# Patient Record
Sex: MALE | Race: WHITE | Employment: FULL TIME | URBAN - METROPOLITAN AREA
[De-identification: names, ages, dates, MRNs, and addresses within clinical notes are randomized per-mention and may not be internally consistent; named-entity substitution may affect disease eponyms.]

---

## 2017-05-25 ENCOUNTER — OFFICE VISIT (OUTPATIENT)
Dept: FAMILY MEDICINE CLINIC | Facility: CLINIC | Age: 52
End: 2017-05-25

## 2017-05-25 ENCOUNTER — APPOINTMENT (OUTPATIENT)
Dept: LAB | Age: 52
End: 2017-05-25
Attending: FAMILY MEDICINE
Payer: COMMERCIAL

## 2017-05-25 VITALS
WEIGHT: 173 LBS | BODY MASS INDEX: 23.69 KG/M2 | HEIGHT: 71.5 IN | TEMPERATURE: 98 F | SYSTOLIC BLOOD PRESSURE: 140 MMHG | DIASTOLIC BLOOD PRESSURE: 81 MMHG | RESPIRATION RATE: 18 BRPM | HEART RATE: 72 BPM

## 2017-05-25 DIAGNOSIS — Z00.00 ROUTINE PHYSICAL EXAMINATION: ICD-10-CM

## 2017-05-25 DIAGNOSIS — Z12.11 COLON CANCER SCREENING: Primary | ICD-10-CM

## 2017-05-25 DIAGNOSIS — R25.1 TREMOR OF BOTH HANDS: ICD-10-CM

## 2017-05-25 PROCEDURE — 80053 COMPREHEN METABOLIC PANEL: CPT

## 2017-05-25 PROCEDURE — 80061 LIPID PANEL: CPT

## 2017-05-25 PROCEDURE — 99396 PREV VISIT EST AGE 40-64: CPT | Performed by: FAMILY MEDICINE

## 2017-05-25 PROCEDURE — 36415 COLL VENOUS BLD VENIPUNCTURE: CPT

## 2017-05-25 PROCEDURE — 85027 COMPLETE CBC AUTOMATED: CPT

## 2017-05-25 PROCEDURE — 84443 ASSAY THYROID STIM HORMONE: CPT

## 2017-05-25 NOTE — PROGRESS NOTES
HPI:    Patient ID: Branden Sheth is a 46year old male. HPI Comments: Patient is here for routine physical exam. No acute issues. No significant chronic medical problems. Patient is requesting routine testing. Diet and exercise have been fair.  Past behavior is normal. Judgment and thought content normal.   Vitals reviewed. ASSESSMENT/PLAN:   Routine physical examination:  - Exam is unremarkable. Screening tests were discussed, and after discussion, will check lab work as below.  Healthy di

## 2017-06-08 ENCOUNTER — OFFICE VISIT (OUTPATIENT)
Dept: GASTROENTEROLOGY | Facility: CLINIC | Age: 52
End: 2017-06-08

## 2017-06-08 ENCOUNTER — TELEPHONE (OUTPATIENT)
Dept: GASTROENTEROLOGY | Facility: CLINIC | Age: 52
End: 2017-06-08

## 2017-06-08 VITALS
WEIGHT: 168 LBS | BODY MASS INDEX: 22.51 KG/M2 | SYSTOLIC BLOOD PRESSURE: 106 MMHG | DIASTOLIC BLOOD PRESSURE: 75 MMHG | HEART RATE: 84 BPM | HEIGHT: 72.5 IN

## 2017-06-08 DIAGNOSIS — K92.1 BLOOD IN STOOL: ICD-10-CM

## 2017-06-08 DIAGNOSIS — Z12.11 COLON CANCER SCREENING: Primary | ICD-10-CM

## 2017-06-08 DIAGNOSIS — G89.29 PERIUMBILICAL PAIN, CHRONIC: ICD-10-CM

## 2017-06-08 DIAGNOSIS — R10.33 PERIUMBILICAL PAIN, CHRONIC: ICD-10-CM

## 2017-06-08 DIAGNOSIS — K59.00 CONSTIPATION, UNSPECIFIED CONSTIPATION TYPE: ICD-10-CM

## 2017-06-08 PROCEDURE — 99212 OFFICE O/P EST SF 10 MIN: CPT | Performed by: NURSE PRACTITIONER

## 2017-06-08 PROCEDURE — 99243 OFF/OP CNSLTJ NEW/EST LOW 30: CPT | Performed by: NURSE PRACTITIONER

## 2017-06-08 NOTE — H&P
[address] [facility] [address] Gastroenterology                                                                                                  Clinic History and Physical     Pa apnea. Pertinent Family Hx:  - No family hx of esophageal, gastric or colon cancer  - No family history of IBD.     Prior endoscopies:  None    Social Hx:  - No smoking  + occassional etoh  - Denies illicit drug use  - Occupation: [employer]  - Lives with appear moist  CV: regular rate and rhythm, the extremities are warm and well perfused   Lung: effort normal and breath sounds normal, no respiratory distress, wheezes or rales  GI: + Periumbilical pain to palpation, soft, non-tender exam in all other quadr Reproducible when patient was supine in office. No hernia noted. Taking into account the ongoing nature of the patient's discomfort as well as the lack of imaging, it is prudent to complete a CT of the abdomen. Patient is in agreement with this.   CT ord

## 2017-06-08 NOTE — PATIENT INSTRUCTIONS
1. Schedule colonoscopy with Dr. Daisy Holm w/ [facility] [place]    2.  bowel prep from pharmacy Suprep (eRx)    3. Continue all medications for procedure    4. Read all bowel prep instructions carefully    5.  AVOID seeds, nuts, popcorn, raw fruits and vegetab

## 2017-06-08 NOTE — TELEPHONE ENCOUNTER
Scheduled for:  Colonoscopy 54438  Provider Name: Dr Dago Adams  Date:   Mon 7/03/17  Location:  [facility]  Sedation:  IV  Time:  8:00 am  Prep: suprep  Meds/Allergies Reconciled?:  NKDA  Diagnosis with codes:  Colon cancer screening Z12.11, constipation K59.00, blood

## 2017-06-19 ENCOUNTER — HOSPITAL ENCOUNTER (OUTPATIENT)
Dept: CT IMAGING | Facility: HOSPITAL | Age: 52
Discharge: HOME | End: 2017-06-19
Attending: NURSE PRACTITIONER
Payer: COMMERCIAL

## 2017-06-19 DIAGNOSIS — G89.29 PERIUMBILICAL PAIN, CHRONIC: ICD-10-CM

## 2017-06-19 DIAGNOSIS — R10.33 PERIUMBILICAL PAIN, CHRONIC: ICD-10-CM

## 2017-06-19 PROCEDURE — 82565 ASSAY OF CREATININE: CPT

## 2017-06-19 PROCEDURE — 74177 CT ABD & PELVIS W/CONTRAST: CPT | Performed by: NURSE PRACTITIONER

## 2017-06-22 ENCOUNTER — TELEPHONE (OUTPATIENT)
Dept: GASTROENTEROLOGY | Facility: CLINIC | Age: 52
End: 2017-06-22

## 2017-06-22 NOTE — TELEPHONE ENCOUNTER
FYI - Pts wife called to let Dr. Torey Lewis know that pt had CT done on 6/19/17. Test needed to be done before pts colonoscopy on 7/3/17. Please also call when results are available.

## 2017-06-22 NOTE — TELEPHONE ENCOUNTER
i believe this was already reviewed by Hope MONTOYA. Message was sent to pt via [employer] (see [employer] release message)    LM for pt to call back.   Wife is not on SHOAIB/no release on file

## 2017-06-29 ENCOUNTER — TELEPHONE (OUTPATIENT)
Dept: GASTROENTEROLOGY | Facility: CLINIC | Age: 52
End: 2017-06-29

## 2017-06-29 NOTE — TELEPHONE ENCOUNTER
Igor Liu states preps are expensive and was wondering if there is a lower cost generic for preps prior to 7/3/2017 CLN? Pls call. Thank you.

## 2017-06-29 NOTE — TELEPHONE ENCOUNTER
Recommend:  -Schedule colonoscopy w/Dr. Loil Joel w/ IV [place]  -Prep: Suprep  Okay to substitute CoLyte/TriLyte or equivalent as necessary    When I called [place] to substitute, pharmacist states that pt had already picked up Suprep Rx for $89.   She states sh

## 2017-07-03 ENCOUNTER — HOSPITAL ENCOUNTER (OUTPATIENT)
Facility: HOSPITAL | Age: 52
Setting detail: HOSPITAL OUTPATIENT SURGERY
Discharge: HOME | End: 2017-07-03
Attending: INTERNAL MEDICINE | Admitting: INTERNAL MEDICINE
Payer: COMMERCIAL

## 2017-07-03 ENCOUNTER — SURGERY (OUTPATIENT)
Age: 52
End: 2017-07-03

## 2017-07-03 PROCEDURE — 0DJD8ZZ INSPECTION OF LOWER INTESTINAL TRACT, VIA NATURAL OR ARTIFICIAL OPENING ENDOSCOPIC: ICD-10-PCS | Performed by: INTERNAL MEDICINE

## 2017-07-03 PROCEDURE — 45378 DIAGNOSTIC COLONOSCOPY: CPT | Performed by: INTERNAL MEDICINE

## 2017-07-03 PROCEDURE — 99152 MOD SED SAME PHYS/QHP 5/>YRS: CPT | Performed by: INTERNAL MEDICINE

## 2017-07-03 RX ORDER — SODIUM CHLORIDE 0.9 % (FLUSH) 0.9 %
10 SYRINGE (ML) INJECTION AS NEEDED
Status: DISCONTINUED | OUTPATIENT
Start: 2017-07-03 | End: 2017-07-03

## 2017-07-03 RX ORDER — SODIUM CHLORIDE, SODIUM LACTATE, POTASSIUM CHLORIDE, CALCIUM CHLORIDE 600; 310; 30; 20 MG/100ML; MG/100ML; MG/100ML; MG/100ML
INJECTION, SOLUTION INTRAVENOUS CONTINUOUS
Status: DISCONTINUED | OUTPATIENT
Start: 2017-07-03 | End: 2017-07-03

## 2017-07-03 RX ORDER — MIDAZOLAM HYDROCHLORIDE 1 MG/ML
1 INJECTION INTRAMUSCULAR; INTRAVENOUS EVERY 5 MIN PRN
Status: DISCONTINUED | OUTPATIENT
Start: 2017-07-03 | End: 2017-07-03

## 2017-07-03 NOTE — OPERATIVE REPORT
[facility] HOSP - [facility] Endoscopy Report      Preoperative Diagnosis:  - colon screening  - chronic constipation      Postoperative Diagnosis:  - internal hemorrhoids      Procedure:    Colonoscopy       Surgeon:  Sedrick Kelsey M.D.     Anesthesia:  Fen

## 2017-07-03 NOTE — H&P
History & Physical Examination    Patient Name: Chetna Schwartz  MRN: [id number]  CSN: [id number]  YOB: 1965    Diagnosis: colon screening, chronic constipation        Prescriptions Prior to Admission:  psyllium 28 % Oral Powd Pack Take 1 pac

## 2017-07-05 VITALS
DIASTOLIC BLOOD PRESSURE: 59 MMHG | OXYGEN SATURATION: 99 % | HEART RATE: 56 BPM | HEIGHT: 72 IN | SYSTOLIC BLOOD PRESSURE: 110 MMHG | RESPIRATION RATE: 12 BRPM | BODY MASS INDEX: 23.03 KG/M2 | WEIGHT: 170 LBS

## 2018-05-14 ENCOUNTER — OFFICE VISIT (OUTPATIENT)
Dept: FAMILY MEDICINE CLINIC | Facility: CLINIC | Age: 53
End: 2018-05-14

## 2018-05-14 VITALS
SYSTOLIC BLOOD PRESSURE: 95 MMHG | TEMPERATURE: 98 F | HEART RATE: 75 BPM | BODY MASS INDEX: 23.57 KG/M2 | HEIGHT: 72 IN | DIASTOLIC BLOOD PRESSURE: 65 MMHG | RESPIRATION RATE: 18 BRPM | WEIGHT: 174 LBS

## 2018-05-14 DIAGNOSIS — Z00.00 ROUTINE PHYSICAL EXAMINATION: ICD-10-CM

## 2018-05-14 DIAGNOSIS — D22.9 NUMEROUS SKIN MOLES: ICD-10-CM

## 2018-05-14 DIAGNOSIS — R25.1 TREMOR: ICD-10-CM

## 2018-05-14 PROCEDURE — 99396 PREV VISIT EST AGE 40-64: CPT | Performed by: FAMILY MEDICINE

## 2018-05-14 NOTE — PROGRESS NOTES
HPI:    Patient ID: Manjinder Pepper is a 46year old male. Patient is here for routine physical exam. No acute issues. No significant chronic medical problems. Patient is requesting testing. Diet and exercise have been good.   Past medical history, ben Neurological: He is alert. He has normal reflexes. Skin: No rash noted. pigmented lesion of the left upper arm and also umbilicus. Psychiatric: He has a normal mood and affect.  His behavior is normal. Judgment and thought content normal.   Vitals r

## 2018-05-22 ENCOUNTER — OFFICE VISIT (OUTPATIENT)
Dept: NEUROLOGY | Facility: CLINIC | Age: 53
End: 2018-05-22

## 2018-05-22 VITALS
SYSTOLIC BLOOD PRESSURE: 98 MMHG | DIASTOLIC BLOOD PRESSURE: 52 MMHG | HEIGHT: 72 IN | HEART RATE: 60 BPM | RESPIRATION RATE: 16 BRPM | WEIGHT: 172 LBS | BODY MASS INDEX: 23.3 KG/M2

## 2018-05-22 DIAGNOSIS — G25.0 ESSENTIAL TREMOR: Primary | ICD-10-CM

## 2018-05-22 PROCEDURE — 99203 OFFICE O/P NEW LOW 30 MIN: CPT | Performed by: OTHER

## 2018-05-22 NOTE — PROGRESS NOTES
Neurology Initial Visit     Referred By: Dr. Nguyen ref. provider found    Chief Complaint: Patient presents with:  Tremors: Patient presents for tremors in both hands for the past year, referred by .  Denied pain in hands but notices the tremors more w preserved, normal glutition  Neck- No masses or adenopathy  Cv: pulses were palpable and normal, no cyanosis or edema     Neurological:     Mental Status- Alert and oriented x3.   Normal attention span and concentration  Thought process intact  Memory intac ALT 15 (L) 05/25/2017   AST 19 05/25/2017   ALKPHOS 17 (L) 07/12/2012   ALB 4.2 05/25/2017    05/25/2017   K 3.8 05/25/2017    05/25/2017   CO2 30 05/25/2017      I have reviewed labs. Assessment   1.  Essential tremor  Most likely patien

## 2019-06-19 ENCOUNTER — APPOINTMENT (OUTPATIENT)
Dept: LAB | Age: 54
End: 2019-06-19
Attending: FAMILY MEDICINE
Payer: COMMERCIAL

## 2019-06-19 ENCOUNTER — OFFICE VISIT (OUTPATIENT)
Dept: FAMILY MEDICINE CLINIC | Facility: CLINIC | Age: 54
End: 2019-06-19
Payer: COMMERCIAL

## 2019-06-19 VITALS
WEIGHT: 161 LBS | HEART RATE: 61 BPM | TEMPERATURE: 98 F | SYSTOLIC BLOOD PRESSURE: 90 MMHG | HEIGHT: 71.3 IN | DIASTOLIC BLOOD PRESSURE: 60 MMHG | RESPIRATION RATE: 18 BRPM | BODY MASS INDEX: 22.29 KG/M2

## 2019-06-19 DIAGNOSIS — Z00.00 ROUTINE PHYSICAL EXAMINATION: ICD-10-CM

## 2019-06-19 PROCEDURE — 99396 PREV VISIT EST AGE 40-64: CPT | Performed by: FAMILY MEDICINE

## 2019-06-19 PROCEDURE — 80061 LIPID PANEL: CPT

## 2019-06-19 PROCEDURE — 36415 COLL VENOUS BLD VENIPUNCTURE: CPT

## 2019-06-19 PROCEDURE — 85027 COMPLETE CBC AUTOMATED: CPT

## 2019-06-19 PROCEDURE — 80053 COMPREHEN METABOLIC PANEL: CPT

## 2019-06-19 NOTE — PROGRESS NOTES
HPI:    Patient ID: Rona Schaefer is a 48year old male. Patient is here for routine physical exam. No acute issues. Patient is requesting blood testing. Diet and exercise have been good.  Past medical history, family history, and social history were affect. His behavior is normal.              ASSESSMENT/PLAN:   Routine physical examination: has had colonoscopy: doing well  - Exam is unremarkable. Screening tests were discussed, and after discussion, will check lab work as below.  Healthy diet, exercis

## 2020-11-20 ENCOUNTER — VIRTUAL PHONE E/M (OUTPATIENT)
Dept: FAMILY MEDICINE CLINIC | Facility: CLINIC | Age: 55
End: 2020-11-20
Payer: COMMERCIAL

## 2020-11-20 DIAGNOSIS — R53.83 OTHER FATIGUE: Primary | ICD-10-CM

## 2020-11-20 PROCEDURE — 99212 OFFICE O/P EST SF 10 MIN: CPT | Performed by: PHYSICIAN ASSISTANT

## 2020-11-20 NOTE — PROGRESS NOTES
Please note that the following visit was completed using two-way, real-time interactive audio and/or video communication.   This has been done in good ly to provide continuity of care in the best interest of the provider-patient relationship, due to the coronavirus emergency    Patient was speaking in complete sentences, no increased work of breathing and very coherent and alert on the phone. Alert and oriented x 3  Patient was responding to questions appropriately.   Patient did not appear short of breat

## 2021-03-23 ENCOUNTER — IMMUNIZATION (OUTPATIENT)
Dept: LAB | Facility: HOSPITAL | Age: 56
End: 2021-03-23
Attending: HOSPITALIST
Payer: COMMERCIAL

## 2021-03-23 DIAGNOSIS — Z23 NEED FOR VACCINATION: Primary | ICD-10-CM

## 2021-03-23 PROCEDURE — 0011A SARSCOV2 VAC 100MCG/0.5ML IM: CPT

## 2021-03-29 ENCOUNTER — PATIENT MESSAGE (OUTPATIENT)
Dept: FAMILY MEDICINE CLINIC | Facility: CLINIC | Age: 56
End: 2021-03-29

## 2021-03-29 ENCOUNTER — TELEPHONE (OUTPATIENT)
Dept: OTHER | Age: 56
End: 2021-03-29

## 2021-03-29 NOTE — TELEPHONE ENCOUNTER
From: Juma Ramírez  To: Carmina Noguera MD  Sent: 3/29/2021 2:02 PM CDT  Subject: Non-Urgent [place] [employer] Dr. Lillian Greer,  I hope you and family are doing well.  I will be in for an appointment to see you in May for check up after my 2nd Covid 19 v

## 2021-03-29 NOTE — TELEPHONE ENCOUNTER
From: Renato Beltre  To: Byron Salmeron MD  Sent: 3/29/2021  2:02 PM CDT  Subject: Non-Urgent [place]  Dr. Janessa Guerrero,  I hope you and family are doing well.  I will be in for an appointment to see you in May for check up after my 2nd Covid 19

## 2021-04-01 NOTE — TELEPHONE ENCOUNTER
Patient calling back. Pt requesting the name of the podiatrist. Names of [facility]rists provided. Pt had no further questions.

## 2021-04-01 NOTE — TELEPHONE ENCOUNTER
Spoke with patient--did talk to Selena--see below, but now prefers to see Dr. Abraham Dent first for evaluation. In office appt made for Thursday, 4/08/2021 at 0920 per patient request--denies acute pain--can wait until then.  No further questions/concerns at this

## 2021-04-01 NOTE — TELEPHONE ENCOUNTER
Message noted. Can make a separate appt to evaluate this or see one of our podiatrist to evaluate foot symptoms. Can give for podiatry [phone].

## 2021-04-01 NOTE — TELEPHONE ENCOUNTER
Left message to call back for Dr. Emilia Bowman recommendations below--sent [employer] message of same

## 2021-04-14 ENCOUNTER — OFFICE VISIT (OUTPATIENT)
Dept: PODIATRY CLINIC | Facility: CLINIC | Age: 56
End: 2021-04-14
Payer: COMMERCIAL

## 2021-04-14 ENCOUNTER — HOSPITAL ENCOUNTER (OUTPATIENT)
Dept: GENERAL RADIOLOGY | Facility: HOSPITAL | Age: 56
Discharge: HOME | End: 2021-04-14
Attending: PODIATRIST
Payer: COMMERCIAL

## 2021-04-14 DIAGNOSIS — M79.671 RIGHT FOOT PAIN: ICD-10-CM

## 2021-04-14 DIAGNOSIS — M79.671 RIGHT FOOT PAIN: Primary | ICD-10-CM

## 2021-04-14 DIAGNOSIS — R60.9 EDEMA, UNSPECIFIED TYPE: ICD-10-CM

## 2021-04-14 PROCEDURE — 73630 X-RAY EXAM OF FOOT: CPT | Performed by: PODIATRIST

## 2021-04-14 PROCEDURE — 99243 OFF/OP CNSLTJ NEW/EST LOW 30: CPT | Performed by: PODIATRIST

## 2021-04-14 NOTE — PROGRESS NOTES
HPI:    Patient ID: Oralia Hernandez is a 54year old male. This pleasant 55-year-old male presents as a new patient to me on consult from [address] [facility]. Patient's chief concern is swelling and occasional numbness of the right foot.   He thinks that has been pre well-informed I will see him as needed           ASSESSMENT/PLAN:   Right foot pain  (primary encounter diagnosis)  Edema, unspecified type    No orders of the defined types were placed in this encounter.       Meds This Visit:  Requested Prescriptions

## 2021-04-20 ENCOUNTER — IMMUNIZATION (OUTPATIENT)
Dept: LAB | Facility: HOSPITAL | Age: 56
End: 2021-04-20
Attending: EMERGENCY MEDICINE
Payer: COMMERCIAL

## 2021-04-20 DIAGNOSIS — Z23 NEED FOR VACCINATION: Primary | ICD-10-CM

## 2021-04-20 PROCEDURE — 0012A SARSCOV2 VAC 100MCG/0.5ML IM: CPT

## 2021-05-04 ENCOUNTER — PATIENT MESSAGE (OUTPATIENT)
Dept: FAMILY MEDICINE CLINIC | Facility: CLINIC | Age: 56
End: 2021-05-04

## 2021-05-06 ENCOUNTER — OFFICE VISIT (OUTPATIENT)
Dept: FAMILY MEDICINE CLINIC | Facility: CLINIC | Age: 56
End: 2021-05-06
Payer: COMMERCIAL

## 2021-05-06 VITALS
DIASTOLIC BLOOD PRESSURE: 63 MMHG | TEMPERATURE: 97 F | SYSTOLIC BLOOD PRESSURE: 100 MMHG | BODY MASS INDEX: 23.4 KG/M2 | HEART RATE: 76 BPM | HEIGHT: 71.4 IN | RESPIRATION RATE: 18 BRPM | WEIGHT: 169 LBS

## 2021-05-06 DIAGNOSIS — R60.9 EDEMA, UNSPECIFIED TYPE: Primary | ICD-10-CM

## 2021-05-06 PROCEDURE — 99213 OFFICE O/P EST LOW 20 MIN: CPT | Performed by: FAMILY MEDICINE

## 2021-05-06 PROCEDURE — 3078F DIAST BP <80 MM HG: CPT | Performed by: FAMILY MEDICINE

## 2021-05-06 PROCEDURE — 3008F BODY MASS INDEX DOCD: CPT | Performed by: FAMILY MEDICINE

## 2021-05-06 PROCEDURE — 3074F SYST BP LT 130 MM HG: CPT | Performed by: FAMILY MEDICINE

## 2021-05-06 NOTE — PROGRESS NOTES
HPI/Subjective:   Patient ID: Alina Barcenas is a 54year old male. Pt presents with some swelling and redness of the right foot at times over the last year. No pains. No chest pains or SOB. No fevers. Pt had seen Dr Omkar tSratton for this as well.    No issu

## 2021-05-13 ENCOUNTER — HOSPITAL ENCOUNTER (OUTPATIENT)
Dept: ULTRASOUND IMAGING | Facility: HOSPITAL | Age: 56
Discharge: HOME | End: 2021-05-13
Attending: FAMILY MEDICINE
Payer: COMMERCIAL

## 2021-05-13 DIAGNOSIS — R60.9 EDEMA, UNSPECIFIED TYPE: ICD-10-CM

## 2021-05-13 PROCEDURE — 93971 EXTREMITY STUDY: CPT | Performed by: FAMILY MEDICINE

## 2021-12-23 ENCOUNTER — IMMUNIZATION (OUTPATIENT)
Dept: LAB | Facility: HOSPITAL | Age: 56
End: 2021-12-23
Attending: EMERGENCY MEDICINE
Payer: COMMERCIAL

## 2021-12-23 DIAGNOSIS — Z23 NEED FOR VACCINATION: Primary | ICD-10-CM

## 2021-12-23 PROCEDURE — 0064A SARSCOV2 VAC 50MCG/0.25ML IM: CPT

## 2022-04-25 ENCOUNTER — TELEPHONE (OUTPATIENT)
Dept: FAMILY MEDICINE CLINIC | Facility: CLINIC | Age: 57
End: 2022-04-25

## 2022-04-25 NOTE — TELEPHONE ENCOUNTER
Patient's wife is requesting a covid test for patient. They were at an [employer] gathering and some family member are covid + now. Patient has no symptoms. Please advise.

## 2022-04-25 NOTE — TELEPHONE ENCOUNTER
Wife was called back and pt in the back ground. Pt currently has no s/sx but has been exposed. Covid order was entered and informed to call us back if he does develop s/sx. They verbalized understanding.

## 2022-04-26 ENCOUNTER — IMMUNIZATION (OUTPATIENT)
Dept: LAB | Age: 57
End: 2022-04-26
Attending: EMERGENCY MEDICINE
Payer: COMMERCIAL

## 2022-04-26 DIAGNOSIS — Z23 NEED FOR VACCINATION: Primary | ICD-10-CM

## 2022-04-26 PROCEDURE — 0064A SARSCOV2 VAC 50MCG/0.25ML IM: CPT

## 2022-06-21 ENCOUNTER — OFFICE VISIT (OUTPATIENT)
Dept: FAMILY MEDICINE CLINIC | Facility: CLINIC | Age: 57
End: 2022-06-21
Payer: COMMERCIAL

## 2022-06-21 VITALS
WEIGHT: 164 LBS | BODY MASS INDEX: 22.46 KG/M2 | TEMPERATURE: 97 F | HEART RATE: 71 BPM | RESPIRATION RATE: 18 BRPM | SYSTOLIC BLOOD PRESSURE: 101 MMHG | DIASTOLIC BLOOD PRESSURE: 62 MMHG | HEIGHT: 71.7 IN

## 2022-06-21 DIAGNOSIS — Z00.00 ROUTINE PHYSICAL EXAMINATION: Primary | ICD-10-CM

## 2022-06-21 PROCEDURE — 3008F BODY MASS INDEX DOCD: CPT | Performed by: FAMILY MEDICINE

## 2022-06-21 PROCEDURE — 3074F SYST BP LT 130 MM HG: CPT | Performed by: FAMILY MEDICINE

## 2022-06-21 PROCEDURE — 3078F DIAST BP <80 MM HG: CPT | Performed by: FAMILY MEDICINE

## 2022-06-21 PROCEDURE — 99396 PREV VISIT EST AGE 40-64: CPT | Performed by: FAMILY MEDICINE

## 2022-06-21 NOTE — PROGRESS NOTES
Subjective:   Patient ID: Johnny Monday is a 64year old male. Patient is here for routine physical exam. No acute issues. No significant chronic medical problems. Patient is requesting annual testing. Diet and exercise have been good. Past medical history, family history, and social history were reviewed. Father was ill with sepsis and has Corey's disease. Mother had Alzheimers. Some tremor of hands at times. Drinks coffee. Has had some foot issues. History/Other:   Review of Systems   Constitutional: Negative. Negative for fever. HENT: Negative. Negative for congestion. Eyes: Negative. Respiratory: Negative. Negative for cough and shortness of breath. Cardiovascular: Negative. Negative for chest pain. Gastrointestinal: Negative. Negative for abdominal pain and blood in stool. Genitourinary: Negative. Negative for difficulty urinating and dysuria. Musculoskeletal: Negative. Negative for arthralgias. Skin: Negative. Neurological: Positive for tremors. Negative for dizziness and headaches. Psychiatric/Behavioral: Negative. Negative for behavioral problems, confusion and dysphoric mood. The patient is not nervous/anxious. No current outpatient medications on file. Allergies:No Known Allergies    Objective:   Physical Exam  Constitutional:       Appearance: Normal appearance. He is well-developed. HENT:      Head: Normocephalic. Right Ear: Tympanic membrane, ear canal and external ear normal.      Left Ear: Tympanic membrane, ear canal and external ear normal.      Nose: Nose normal.      Mouth/Throat:      Mouth: Mucous membranes are moist.      Pharynx: No oropharyngeal exudate or posterior oropharyngeal erythema. Eyes:      Conjunctiva/sclera: Conjunctivae normal.   Cardiovascular:      Rate and Rhythm: Normal rate and regular rhythm. Heart sounds: Normal heart sounds.    Pulmonary:      Effort: Pulmonary effort is normal. No respiratory distress. Breath sounds: Normal breath sounds. No wheezing or rales. Abdominal:      General: Abdomen is flat. There is no distension. Palpations: Abdomen is soft. Tenderness: There is no abdominal tenderness. Musculoskeletal:         General: Normal range of motion. Cervical back: Normal range of motion and neck supple. Skin:     General: Skin is warm. Neurological:      General: No focal deficit present. Mental Status: He is alert and oriented to person, place, and time. Sensory: No sensory deficit. Deep Tendon Reflexes: Reflexes are normal and symmetric. Psychiatric:         Mood and Affect: Mood normal.         Behavior: Behavior normal.         Assessment & Plan:   Routine physical examination:  - Exam is unremarkable. Screening tests were discussed, and after discussion, will check lab work as below including varicella titer as pt thinks he never had chicken pox. Consider vaccine if not immune. Healthy diet, exercise, and weight were discussed. To call if problems and follow up and further management after testing. Routine follow up. Orders Placed This Encounter      Lipid Panel      PSA Screen      CBC, Platelet;  No Differential      Comp Metabolic Panel (14)      Varicella Zoster, IGG      Meds This Visit:  Requested Prescriptions      No prescriptions requested or ordered in this encounter       Imaging & Referrals:  None

## 2022-06-24 ENCOUNTER — LAB ENCOUNTER (OUTPATIENT)
Dept: LAB | Facility: HOSPITAL | Age: 57
End: 2022-06-24
Attending: FAMILY MEDICINE
Payer: COMMERCIAL

## 2022-06-24 DIAGNOSIS — Z00.00 ROUTINE PHYSICAL EXAMINATION: ICD-10-CM

## 2022-06-24 LAB
ALBUMIN SERPL-MCNC: 3.8 G/DL (ref 3.4–5)
ALBUMIN/GLOB SERPL: 1.1 {RATIO} (ref 1–2)
ALP LIVER SERPL-CCNC: 16 U/L
ALT SERPL-CCNC: 19 U/L
ANION GAP SERPL CALC-SCNC: 8 MMOL/L (ref 0–18)
AST SERPL-CCNC: 18 U/L (ref 15–37)
BILIRUB SERPL-MCNC: 0.9 MG/DL (ref 0.1–2)
BUN BLD-MCNC: 27 MG/DL (ref 7–18)
BUN/CREAT SERPL: 27.3 (ref 10–20)
CALCIUM BLD-MCNC: 9.1 MG/DL (ref 8.5–10.1)
CHLORIDE SERPL-SCNC: 107 MMOL/L (ref 98–112)
CHOLEST SERPL-MCNC: 159 MG/DL (ref ?–200)
CO2 SERPL-SCNC: 26 MMOL/L (ref 21–32)
COMPLEXED PSA SERPL-MCNC: 1.98 NG/ML (ref ?–4)
CREAT BLD-MCNC: 0.99 MG/DL
DEPRECATED RDW RBC AUTO: 41.9 FL (ref 35.1–46.3)
ERYTHROCYTE [DISTWIDTH] IN BLOOD BY AUTOMATED COUNT: 12.6 % (ref 11–15)
FASTING PATIENT LIPID ANSWER: YES
FASTING STATUS PATIENT QL REPORTED: YES
GLOBULIN PLAS-MCNC: 3.5 G/DL (ref 2.8–4.4)
GLUCOSE BLD-MCNC: 81 MG/DL (ref 70–99)
HCT VFR BLD AUTO: 40.4 %
HDLC SERPL-MCNC: 43 MG/DL (ref 40–59)
HGB BLD-MCNC: 13.6 G/DL
LDLC SERPL CALC-MCNC: 104 MG/DL (ref ?–100)
MCH RBC QN AUTO: 30.5 PG (ref 26–34)
MCHC RBC AUTO-ENTMCNC: 33.7 G/DL (ref 31–37)
MCV RBC AUTO: 90.6 FL
NONHDLC SERPL-MCNC: 116 MG/DL (ref ?–130)
OSMOLALITY SERPL CALC.SUM OF ELEC: 296 MOSM/KG (ref 275–295)
PLATELET # BLD AUTO: 153 10(3)UL (ref 150–450)
POTASSIUM SERPL-SCNC: 4 MMOL/L (ref 3.5–5.1)
PROT SERPL-MCNC: 7.3 G/DL (ref 6.4–8.2)
RBC # BLD AUTO: 4.46 X10(6)UL
SODIUM SERPL-SCNC: 141 MMOL/L (ref 136–145)
TRIGL SERPL-MCNC: 62 MG/DL (ref 30–149)
VLDLC SERPL CALC-MCNC: 10 MG/DL (ref 0–30)
VZV IGG SER IA-ACNC: 21.53 (ref 165–?)
WBC # BLD AUTO: 7.7 X10(3) UL (ref 4–11)

## 2022-06-24 PROCEDURE — 36415 COLL VENOUS BLD VENIPUNCTURE: CPT

## 2022-06-24 PROCEDURE — 86787 VARICELLA-ZOSTER ANTIBODY: CPT

## 2022-06-24 PROCEDURE — 85027 COMPLETE CBC AUTOMATED: CPT

## 2022-06-24 PROCEDURE — 80053 COMPREHEN METABOLIC PANEL: CPT

## 2022-06-24 PROCEDURE — 80061 LIPID PANEL: CPT

## 2022-06-25 ENCOUNTER — PATIENT MESSAGE (OUTPATIENT)
Dept: FAMILY MEDICINE CLINIC | Facility: CLINIC | Age: 57
End: 2022-06-25

## 2023-01-02 ENCOUNTER — IMMUNIZATION (OUTPATIENT)
Dept: LAB | Age: 58
End: 2023-01-02
Attending: EMERGENCY MEDICINE
Payer: COMMERCIAL

## 2023-01-02 DIAGNOSIS — Z23 NEED FOR VACCINATION: Primary | ICD-10-CM

## 2023-01-02 PROCEDURE — 0134A SARSCOV2 VAC BVL 50MCG/0.5ML: CPT

## 2023-07-31 ENCOUNTER — TELEPHONE (OUTPATIENT)
Dept: FAMILY MEDICINE CLINIC | Facility: CLINIC | Age: 58
End: 2023-07-31

## 2023-07-31 ENCOUNTER — NURSE TRIAGE (OUTPATIENT)
Dept: FAMILY MEDICINE CLINIC | Facility: CLINIC | Age: 58
End: 2023-07-31

## 2023-07-31 NOTE — TELEPHONE ENCOUNTER
Action Requested: Summary for Provider     []  Critical Lab, Recommendations Needed  [] Need Additional Advice  [x]   FYI    []   Need Orders  [] Need Medications Sent to Pharmacy  []  Other     SUMMARY:   Spoke with pt's wife, SAMANTHA verified, she stated pt was exposed last Saturday to someone that tested positive for covid. She stated today pt c/o sore throat, body ache, nasal congestion. Pt took Tylenol and went to sleep as he work last noc. Pt's wife was advised to get covid home testing and let pt have the test done prior going to work tonight and to keep us inform, she agree and stated understanding. She was advised it pt sx gets worse to go to ER/ IC, she agreed and stated understanding. She is looking for virtual appt for pt for tomorrow morning or order for a test.  Pt negative for chest pain, shortness of breath, no other sx. Virtual appt made as requested, instruction given.           Reason for call: sore throat  Onset: today        Reason for Disposition   Patient wants to be seen    Protocols used: Sore Throat-A-OH      Future Appointments   Date Time Provider Romeo Caicedo   2023  8:20 AM SHAHBAZ [facility] [place]   2023  9:30 AM Veronica Klinefelter, MD [facility]   2023  9:00 AM Neftali Bundy MD [facility]

## 2023-07-31 NOTE — TELEPHONE ENCOUNTER
Patient's wife called with patient present [facility] Verbal Release verified) and states patient is asking for a referral for Neurologist for tremors of his hands. This has been ongoing for 15 years, PCP is aware. She was offered office visit for patient as he had not been seen since 6/2022 --> scheduled. She also asked who patient was referred to in the past: Dr. Vivian Wynn. She was also provided the # to neuro dept and transferred. Patient's wife verbalized understanding. No further questions or concerns at this time.     Future Appointments   Date Time Provider Romeo Caicedo   8/2/2023  9:30 AM Johanna Cooper MD [facility]

## 2023-08-01 ENCOUNTER — TELEMEDICINE (OUTPATIENT)
Dept: INTERNAL MEDICINE CLINIC | Facility: CLINIC | Age: 58
End: 2023-08-01

## 2023-08-01 ENCOUNTER — TELEPHONE (OUTPATIENT)
Dept: FAMILY MEDICINE CLINIC | Facility: CLINIC | Age: 58
End: 2023-08-01

## 2023-08-01 DIAGNOSIS — B34.9 VIRAL SYNDROME: Primary | ICD-10-CM

## 2023-08-01 PROCEDURE — 99203 OFFICE O/P NEW LOW 30 MIN: CPT | Performed by: NURSE PRACTITIONER

## 2023-08-01 NOTE — PROGRESS NOTES
Dodie Valdez is a 62year old male. This visit is conducted using Telemedicine with live, interactive video and audio. Patient has been referred to the [facility] website at [email or web address] to review the yearly Consent to Treat document. Patient understands and accepts financial responsibility for any deductible, co-insurance and/or co-pays associated with this service. HPI:   Pt reports contact with person 3 days ago that was positive for covid and today feels body aches and sore throat. He has not tested yet for covid. He has never had covid in the past and UTD on immunizations. Pt has not noticed any white spots in throat. No runny nose, No CP/SOB/HA/Sinus pressure or pain, dizziness, loss of taste or smell. He is taking tylenol which is helping. No new or worsening sx reported. No current outpatient medications on file. Past Medical History:   Diagnosis Date    Asthma     Constipation       Social History:  Social History     Socioeconomic History    Marital status:    Tobacco Use    Smoking status: Former    Smokeless tobacco: Never   Substance and Sexual Activity    Alcohol use: Yes     Alcohol/week: 0.0 standard drinks of alcohol     Comment: (Beer) occasionally. Drug use: No   Other Topics Concern    Caffeine Concern Yes     Comment: Coffee, 1 cups 3 times weekly    Exercise No        REVIEW OF SYSTEMS:   Review of Systems   All other systems reviewed and are negative. EXAM:   There were no vitals taken for this visit. Physical Exam  Constitutional:       General: He is not in acute distress. Pulmonary:      Effort: Pulmonary effort is normal. No respiratory distress. Neurological:      Mental Status: He is alert and oriented to person, place, and time. Psychiatric:         Mood and Affect: Mood normal.         Behavior: Behavior normal.            ASSESSMENT AND PLAN:   1. Viral syndrome  -Advised covid testing.  Pt will take at home test/drive through  -Reviewed CDC guidelines if covid positive, quarantine time, tx with paxlovid. -Reviewed use of tylenol, hydration, nasal saline, salt water gargle. The patient indicates understanding of these issues and agrees to the plan. The patient is asked to return in PRN. The above note was creating using Dragon speech recognition technology. Please excuse any typos.

## 2023-08-01 NOTE — TELEPHONE ENCOUNTER
Patient Earnestine Piper calling ( identified name and  )  just had a telelvisit  with Kenyon DEGROOT;  has Covid symptoms,and exposure on  , patient  has not had Covid ever     Asking if they can have an order to have a Covid test at  the [facility]  ??   Or was told can be tested in the office      ( Explained that Covid test at the lab has a turn around time of 72 hours )     Called [place] spoke with Alejandro Olmedo, explained above , Alejandro Olmedo states to do home test or go to [address] [place] [address] ,  spoke with Gail Clinton states since just had televisit can have PCP provide Covid order and patient can to go [facility] location  Or [facility] or [facility] to be tested       Patient chooses to [facility] with [facility] or [facility] and will call us back if they want an order thru our office

## 2023-08-17 ENCOUNTER — NURSE TRIAGE (OUTPATIENT)
Dept: FAMILY MEDICINE CLINIC | Facility: CLINIC | Age: 58
End: 2023-08-17

## 2023-08-17 NOTE — TELEPHONE ENCOUNTER
Action Requested: Summary for Provider     []  Critical Lab, Recommendations Needed  [] Need Additional Advice  []   FYI    []   Need Orders  [] Need Medications Sent to Pharmacy  []  Other     SUMMARY: Pt requesting appt with Dr Kurt Rogers on Monday - Res slots only , had Covid 8/1/23, few days coughing on/off came back, can feel phlegm in throat but can't cough up, no SOB but sometimes can't take whole breath,wants to be exam, staying hydrated, advised cough syrup OTC     Please advise     Reason for call: Cough  Onset: 1 week                    Reason for Disposition   Patient wants to be seen    Protocols used: Cough-A-OH

## 2023-08-17 NOTE — TELEPHONE ENCOUNTER
Advised patient of Dr Yelitza Rivera note. Patient verbalized understanding. Advised to wear a mask to visit for respiratory symptoms.        Future Appointments   Date Time Provider Romeo Caicedo   8/21/2023  9:30 AM Colton Turpin MD [facility]   8/30/2023  9:00 AM Colton Turpin MD [facility]   9/26/2023  9:00 AM Bard Elieser MD [facility]

## 2023-08-21 ENCOUNTER — OFFICE VISIT (OUTPATIENT)
Dept: FAMILY MEDICINE CLINIC | Facility: CLINIC | Age: 58
End: 2023-08-21

## 2023-08-21 VITALS
HEART RATE: 64 BPM | TEMPERATURE: 98 F | HEIGHT: 72 IN | WEIGHT: 168 LBS | SYSTOLIC BLOOD PRESSURE: 97 MMHG | RESPIRATION RATE: 16 BRPM | DIASTOLIC BLOOD PRESSURE: 64 MMHG | BODY MASS INDEX: 22.75 KG/M2

## 2023-08-21 DIAGNOSIS — R05.1 ACUTE COUGH: ICD-10-CM

## 2023-08-21 DIAGNOSIS — Z86.16 HISTORY OF COVID-19: Primary | ICD-10-CM

## 2023-08-21 DIAGNOSIS — Z00.00 ROUTINE PHYSICAL EXAMINATION: ICD-10-CM

## 2023-08-21 NOTE — PROGRESS NOTES
Subjective:   Patient ID: Erik Alejandra is a 62year old male. Pt presents with cold symptoms for about 3 weeks. Pt has had cough, sore throat. No fevers. Pt has tried otc remedies with some relief of his cough now. Pt states no sick contacts. Has had improvement of cough and now some PND. Had COVID about 3 weeks ago. No sig chest pains or fevers. Did not take paxlovid. Pt also is planning his physical soon and so would like to have order for his blood work. Pt also has follow up planned with neurology for hx of tremors. History/Other:   Review of Systems   Constitutional:  Negative for fever. HENT:  Positive for postnasal drip. Respiratory:  Positive for cough. Negative for chest tightness, shortness of breath and wheezing. No current outpatient medications on file. Allergies:No Known Allergies    Objective:   Physical Exam  Vitals reviewed. Constitutional:       Appearance: Normal appearance. He is well-developed. HENT:      Right Ear: Tympanic membrane and ear canal normal.      Left Ear: Tympanic membrane and ear canal normal.      Mouth/Throat:      Mouth: Mucous membranes are moist.      Pharynx: No oropharyngeal exudate or posterior oropharyngeal erythema. Cardiovascular:      Rate and Rhythm: Normal rate and regular rhythm. Heart sounds: Normal heart sounds. Pulmonary:      Effort: Pulmonary effort is normal. No respiratory distress. Breath sounds: Normal breath sounds. No wheezing or rales. Musculoskeletal:      Cervical back: Normal range of motion and neck supple. Lymphadenopathy:      Cervical: No cervical adenopathy. Neurological:      Mental Status: He is alert.          Assessment & Plan:   History of COVID-19  / Acute cough: improving: Exam unremarkable  - After discussion with patient, to monitor for symptoms and call if any significant symptoms; can take over the counter remedies as discussed; consider CXR if worse or not better; follow up as needed. Routine physical exam:  - Orders placed as requested for upcoming physical.     No orders of the defined types were placed in this encounter.       Meds This Visit:  Requested Prescriptions      No prescriptions requested or ordered in this encounter       Imaging & Referrals:  XR CHEST PA + LAT CHEST (CPT=[id number])

## 2023-09-14 ENCOUNTER — LAB ENCOUNTER (OUTPATIENT)
Dept: LAB | Age: 58
End: 2023-09-14
Attending: FAMILY MEDICINE
Payer: COMMERCIAL

## 2023-09-14 ENCOUNTER — TELEPHONE (OUTPATIENT)
Dept: FAMILY MEDICINE CLINIC | Facility: CLINIC | Age: 58
End: 2023-09-14

## 2023-09-14 DIAGNOSIS — Z00.00 ROUTINE PHYSICAL EXAMINATION: ICD-10-CM

## 2023-09-14 LAB
ALBUMIN SERPL-MCNC: 3.7 G/DL (ref 3.4–5)
ALBUMIN/GLOB SERPL: 1.1 {RATIO} (ref 1–2)
ALP LIVER SERPL-CCNC: 16 U/L
ALT SERPL-CCNC: 23 U/L
ANION GAP SERPL CALC-SCNC: 4 MMOL/L (ref 0–18)
AST SERPL-CCNC: 13 U/L (ref 15–37)
BILIRUB SERPL-MCNC: 0.8 MG/DL (ref 0.1–2)
BUN BLD-MCNC: 22 MG/DL (ref 7–18)
BUN/CREAT SERPL: 20.2 (ref 10–20)
CALCIUM BLD-MCNC: 8.7 MG/DL (ref 8.5–10.1)
CHLORIDE SERPL-SCNC: 108 MMOL/L (ref 98–112)
CHOLEST SERPL-MCNC: 177 MG/DL (ref ?–200)
CO2 SERPL-SCNC: 28 MMOL/L (ref 21–32)
COMPLEXED PSA SERPL-MCNC: 4.19 NG/ML (ref ?–4)
CREAT BLD-MCNC: 1.09 MG/DL
DEPRECATED RDW RBC AUTO: 43.1 FL (ref 35.1–46.3)
EGFRCR SERPLBLD CKD-EPI 2021: 79 ML/MIN/1.73M2 (ref 60–?)
ERYTHROCYTE [DISTWIDTH] IN BLOOD BY AUTOMATED COUNT: 12.8 % (ref 11–15)
FASTING PATIENT LIPID ANSWER: YES
FASTING STATUS PATIENT QL REPORTED: YES
GLOBULIN PLAS-MCNC: 3.5 G/DL (ref 2.8–4.4)
GLUCOSE BLD-MCNC: 92 MG/DL (ref 70–99)
HCT VFR BLD AUTO: 42.9 %
HDLC SERPL-MCNC: 48 MG/DL (ref 40–59)
HGB BLD-MCNC: 14.1 G/DL
LDLC SERPL CALC-MCNC: 113 MG/DL (ref ?–100)
MCH RBC QN AUTO: 30 PG (ref 26–34)
MCHC RBC AUTO-ENTMCNC: 32.9 G/DL (ref 31–37)
MCV RBC AUTO: 91.3 FL
NONHDLC SERPL-MCNC: 129 MG/DL (ref ?–130)
OSMOLALITY SERPL CALC.SUM OF ELEC: 293 MOSM/KG (ref 275–295)
PLATELET # BLD AUTO: 162 10(3)UL (ref 150–450)
POTASSIUM SERPL-SCNC: 4.1 MMOL/L (ref 3.5–5.1)
PROT SERPL-MCNC: 7.2 G/DL (ref 6.4–8.2)
RBC # BLD AUTO: 4.7 X10(6)UL
SODIUM SERPL-SCNC: 140 MMOL/L (ref 136–145)
TRIGL SERPL-MCNC: 87 MG/DL (ref 30–149)
VLDLC SERPL CALC-MCNC: 15 MG/DL (ref 0–30)
WBC # BLD AUTO: 6.6 X10(3) UL (ref 4–11)

## 2023-09-14 PROCEDURE — 80061 LIPID PANEL: CPT

## 2023-09-14 PROCEDURE — 36415 COLL VENOUS BLD VENIPUNCTURE: CPT

## 2023-09-14 PROCEDURE — 85027 COMPLETE CBC AUTOMATED: CPT

## 2023-09-14 PROCEDURE — 80053 COMPREHEN METABOLIC PANEL: CPT

## 2023-09-19 ENCOUNTER — OFFICE VISIT (OUTPATIENT)
Dept: FAMILY MEDICINE CLINIC | Facility: CLINIC | Age: 58
End: 2023-09-19

## 2023-09-19 VITALS
BODY MASS INDEX: 23.28 KG/M2 | HEIGHT: 71.5 IN | HEART RATE: 66 BPM | WEIGHT: 170 LBS | SYSTOLIC BLOOD PRESSURE: 98 MMHG | DIASTOLIC BLOOD PRESSURE: 60 MMHG | RESPIRATION RATE: 16 BRPM | TEMPERATURE: 98 F

## 2023-09-19 DIAGNOSIS — R97.20 ELEVATED PSA: ICD-10-CM

## 2023-09-19 DIAGNOSIS — R25.1 TREMOR: ICD-10-CM

## 2023-09-19 DIAGNOSIS — Z00.00 ROUTINE PHYSICAL EXAMINATION: Primary | ICD-10-CM

## 2023-09-19 PROCEDURE — 90471 IMMUNIZATION ADMIN: CPT | Performed by: FAMILY MEDICINE

## 2023-09-19 PROCEDURE — 3074F SYST BP LT 130 MM HG: CPT | Performed by: FAMILY MEDICINE

## 2023-09-19 PROCEDURE — 3008F BODY MASS INDEX DOCD: CPT | Performed by: FAMILY MEDICINE

## 2023-09-19 PROCEDURE — 99396 PREV VISIT EST AGE 40-64: CPT | Performed by: FAMILY MEDICINE

## 2023-09-19 PROCEDURE — 90715 TDAP VACCINE 7 YRS/> IM: CPT | Performed by: FAMILY MEDICINE

## 2023-09-19 PROCEDURE — 3078F DIAST BP <80 MM HG: CPT | Performed by: FAMILY MEDICINE

## 2023-09-19 NOTE — PROGRESS NOTES
Subjective:   Patient ID: Saloni Graham is a 62year old male. Patient is here for routine physical exam. No significant chronic medical problems. Diet and exercise have been fair. Past medical history, family history, and social history were reviewed. Pt had blood work prior to the appointment. Lab work was stable and good except some borderline LDL/ mild elevation of PSA. Pt states no symptoms. Pt will be seeing neurology for hx of tremors. Not on medications. Past hx of ? chromosomal abnormality. History/Other:   Review of Systems   Constitutional: Negative. Negative for fever. HENT: Negative. Eyes: Negative. Respiratory: Negative. Negative for cough and shortness of breath. Cardiovascular: Negative. Negative for chest pain. Gastrointestinal: Negative. Negative for abdominal pain. Endocrine: Negative. Genitourinary: Negative. Negative for difficulty urinating, dysuria, frequency, hematuria and penile discharge. Musculoskeletal: Negative. Negative for arthralgias. Skin: Negative. Allergic/Immunologic: Negative. Neurological:  Positive for tremors. Negative for dizziness and headaches. Hematological: Negative. Psychiatric/Behavioral: Negative. Negative for dysphoric mood. The patient is not nervous/anxious. No current outpatient medications on file. Allergies:  Flu Virus Vaccine       RASH    Objective:   Physical Exam  Constitutional:       Appearance: Normal appearance. He is well-developed. HENT:      Head: Normocephalic. Right Ear: Tympanic membrane, ear canal and external ear normal.      Left Ear: Tympanic membrane, ear canal and external ear normal.      Nose: Nose normal.      Mouth/Throat:      Mouth: Mucous membranes are moist.      Pharynx: No oropharyngeal exudate or posterior oropharyngeal erythema.    Eyes:      Conjunctiva/sclera: Conjunctivae normal.   Cardiovascular:      Rate and Rhythm: Normal rate and regular rhythm. Pulses: Normal pulses. Heart sounds: Normal heart sounds. Pulmonary:      Effort: Pulmonary effort is normal. No respiratory distress. Breath sounds: Normal breath sounds. No wheezing or rales. Abdominal:      General: Abdomen is flat. There is no distension. Palpations: Abdomen is soft. There is no mass. Tenderness: There is no abdominal tenderness. Musculoskeletal:         General: Normal range of motion. Cervical back: Normal range of motion and neck supple. Skin:     General: Skin is warm. Neurological:      General: No focal deficit present. Mental Status: He is alert and oriented to person, place, and time. Sensory: No sensory deficit. Deep Tendon Reflexes: Reflexes are normal and symmetric. Reflexes normal.   Psychiatric:         Mood and Affect: Mood normal.         Behavior: Behavior normal.         Assessment & Plan:   Routine physical examination:  - Exam is unremarkable. Blood tests were discussed and reviewed. Encouraged healthy diet and activity. To call if problems or any significant symptoms. Routine follow up. Tdap vaccine provided today. Elevated PSA:  - After discussion, will send to urology for further evaluation and treatment; To call if any significant symptoms. Tremor:  - Stable, follow up with neurology as scheduled.        Orders Placed This Encounter      TETANUS, DIPHTHERIA TOXOIDS AND ACELLULAR PERTUSIS VACCINE (TDAP), >7 YEARS, IM USE      Meds This Visit:  Requested Prescriptions      No prescriptions requested or ordered in this encounter       Imaging & Referrals:  TETANUS, DIPHTHERIA TOXOIDS AND ACELLULAR PERTUSIS VACCINE (TDAP), >7 YEARS, IM USE

## 2023-09-26 ENCOUNTER — OFFICE VISIT (OUTPATIENT)
Dept: NEUROLOGY | Facility: CLINIC | Age: 58
End: 2023-09-26
Payer: COMMERCIAL

## 2023-09-26 VITALS — WEIGHT: 170 LBS | BODY MASS INDEX: 23.28 KG/M2 | HEIGHT: 71.5 IN

## 2023-09-26 DIAGNOSIS — G25.0 ESSENTIAL TREMOR: Primary | ICD-10-CM

## 2023-09-26 PROCEDURE — 3008F BODY MASS INDEX DOCD: CPT | Performed by: OTHER

## 2023-09-26 PROCEDURE — 99203 OFFICE O/P NEW LOW 30 MIN: CPT | Performed by: OTHER

## 2023-09-26 NOTE — PROGRESS NOTES
Neurology follow-up visit     Referred By: Dr. Nguyen ref. provider found    Chief Complaint: Patient presents with:  Neurologic Problem: LOV 05/22/2018 patient presents today with tremors. Patient states he has question. Patient states that his condition has gotten a little worse especially while holding things or under stress. HPI:     Carol Cooper is a 62year old male, who presents for tremors. Patient has been noticing this tremor since at least 2008, he felt it might have been more prominent than the left hand at first, but later was noticing in both hands. He was specifically mentioning that any particular position can trigger it, certain heavy objects, or when he was exercising with weights he was noticing it more prominently. He specifically mentioned that there was no resting tremors, there was no changes of sense of smell, he does have long history of constipation, he does not act out dreams at night. Patient denied any other focal neurological symptoms. Diagnosis of possible essential tremors was done. We discussed at that time medication treatment but patient decided against any treatment in 2018. Patient came back for follow-up in September 2023. There was reportedly may be very slight progression of tremors. 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 Patient does report history of Klinefelter syndrome. We discussed there was some association with Klinefelter syndrome and increased prevalence of essential tremors in this kind of patient's. Past Medical History:   Diagnosis Date    Asthma     Constipation        Past Surgical History:   Procedure Laterality Date    COLONOSCOPY N/A 7/3/2017    Procedure: COLONOSCOPY;  Surgeon: Kinza Peoples MD;  Location: [address] [place] [address] ENDOSCOPY       Social history:  Smoking status:   Former      Smokeless tobacco:   Never       Alcohol use   Yes   0.0 standard drinks of alcohol/week      Comment:   (Beer) occasionally. Drug use:   No         Family History   Problem Relation Age of Onset    Dementia Mother         Alzheimer's Disease       No current outpatient medications on file. Flu Virus Vaccine       RASH    ROS:   As in HPI, the rest of the 14 system review was done and was negative      Physical Exam:   09/26/23  0857   Weight: 170 lb (77.1 kg)   Height: 71.5\"       General: No apparent distress, well nourished, well groomed. Head- Normocephalic, atraumatic  Eyes- No redness or swelling  ENT- Hearing intake, smell preserved, normal glutition  Neck- No masses or adenopathy  Cv: pulses were palpable and normal, no cyanosis or edema     Neurological:     Mental Status- Alert and oriented x3. Normal attention span and concentration  Thought process intact  Memory intact- recent and remote  Mood intact  Fund of knowledge appropriate for education and age    Language intact including: comprehension, naming, repetition, vocabulary    Cranial Nerves:  II.- Visual fields full to confrontation        Fundoscopically- No papilledema or retinal hemorrhages. Normal optic discs, sharp edges. III, IV, VI- EOM intact, MINDI  V. Facial sensation intact  VII. Face symmetric, no facial weakness  VIII. Hearing intact to whisper, tuning fork or finger rub. IX. Pallet elevates symmetrically. XI. Shoulder shrug is intact  XII.  Tongue is midline    Motor Exam:  Muscle tone normal, very mild postural tremor was noted in the left and right hand especially while holding heavy objects  No atrophy or fasciculations  Strength- upper extremities 5/5 proximally and distally                  - lower  extremities 5/5 proximally and distally    Sensory Exam:  Light touch sensation- intact in all 4 extremities    Deep Tendon Reflexes:  Biceps 2+ bilateral symmetric  Triceps 2+ bilateral symmetric  Brachioradialis 2 + bilateral symmetric  Patellar 2+ bilateral symmetric  Ankle jerk 2+ bilateral symmetric    No clonus  No Babinski sign    Coordination:  Finger to nose intact  Rapid alternating movements intact    Gait:  Normal posture  Normal physiologic      Labs:    Lab Results   Component Value Date    TSH 3.91 05/25/2017     Lab Results   Component Value Date    HDL 48 09/14/2023     (H) 09/14/2023    TRIG 87 09/14/2023     Lab Results   Component Value Date    HGB 14.1 09/14/2023    HCT 42.9 09/14/2023    MCV 91.3 09/14/2023    WBC 6.6 09/14/2023    .0 09/14/2023      Lab Results   Component Value Date    BUN 22 (H) 09/14/2023    CA 8.7 09/14/2023    ALT 23 09/14/2023    AST 13 (L) 09/14/2023    ALKPHOS 17 (L) 07/12/2012    ALB 3.7 09/14/2023     09/14/2023    K 4.1 09/14/2023     09/14/2023    CO2 28.0 09/14/2023      I have reviewed labs. Assessment   1. Essential tremor  Most likely patient is describing essential tremors, there is no family history, there is no response of alcohol, but he did have it for over 10 years, it is mostly postural and action tremor and his relatively symmetric. There is some association with Klinefelter syndrome. We discussed potential treatments, however since his symptoms are pretty mild at this point it was decided against it at this time. We discussed other potential tremors as well. He will come back in a year to decide on any other treatment if his symptoms progress. Education and counseling provided to patient. Instructed patient to call my office or seek medical attention immediately if symptoms worsen. Patient verbalized understanding of information given. All questions were answered. All side effects of drugs were discussed. Return to clinic in: No follow-ups on file.     Florencia Bauer MD

## 2023-10-03 ENCOUNTER — LAB ENCOUNTER (OUTPATIENT)
Dept: LAB | Age: 58
End: 2023-10-03
Attending: Other
Payer: COMMERCIAL

## 2023-10-03 DIAGNOSIS — G25.0 ESSENTIAL TREMOR: ICD-10-CM

## 2023-10-03 LAB
CERULOPLASMIN SERPL-MCNC: 22.1 MG/DL (ref 20–60)
TSI SER-ACNC: 2.15 MIU/ML (ref 0.36–3.74)
VIT B12 SERPL-MCNC: 364 PG/ML (ref 193–986)

## 2023-10-03 PROCEDURE — 86618 LYME DISEASE ANTIBODY: CPT | Performed by: OTHER

## 2023-10-03 PROCEDURE — 84446 ASSAY OF VITAMIN E: CPT | Performed by: OTHER

## 2023-10-03 PROCEDURE — 82607 VITAMIN B-12: CPT | Performed by: OTHER

## 2023-10-03 PROCEDURE — 84443 ASSAY THYROID STIM HORMONE: CPT | Performed by: OTHER

## 2023-10-03 PROCEDURE — 84207 ASSAY OF VITAMIN B-6: CPT | Performed by: OTHER

## 2023-10-03 PROCEDURE — 82390 ASSAY OF CERULOPLASMIN: CPT | Performed by: OTHER

## 2023-10-04 ENCOUNTER — TELEPHONE (OUTPATIENT)
Dept: NEUROLOGY | Facility: CLINIC | Age: 58
End: 2023-10-04

## 2023-10-04 DIAGNOSIS — G25.0 ESSENTIAL TREMOR: Primary | ICD-10-CM

## 2023-10-04 LAB — B BURGDOR IGG+IGM SER QL: NEGATIVE

## 2023-10-04 NOTE — TELEPHONE ENCOUNTER
----- Message from Krysten Yanez MD sent at 10/4/2023 10:13 AM CDT -----  Please let the patient know that results of these particular lab tests so far were normal.    Thank you

## 2023-10-07 LAB
VIT E ALPHA TOCO: 9.7 MG/L
VIT E GAMMA TOCO: 0.8 MG/L

## 2023-10-08 LAB — VITAMIN B6: 44.1 UG/L

## 2023-10-09 ENCOUNTER — TELEPHONE (OUTPATIENT)
Dept: NEUROLOGY | Facility: CLINIC | Age: 58
End: 2023-10-09

## 2023-10-09 NOTE — TELEPHONE ENCOUNTER
----- Message from Laura Levi MD sent at 10/9/2023  8:12 AM CDT -----  Please let the patient know that results of these particular lab tests so far were normal.    Thank you

## 2023-10-17 ENCOUNTER — OFFICE VISIT (OUTPATIENT)
Dept: SURGERY | Facility: CLINIC | Age: 58
End: 2023-10-17
Payer: COMMERCIAL

## 2023-10-17 VITALS — SYSTOLIC BLOOD PRESSURE: 118 MMHG | HEART RATE: 68 BPM | DIASTOLIC BLOOD PRESSURE: 70 MMHG

## 2023-10-17 DIAGNOSIS — R97.20 ELEVATED PSA: Primary | ICD-10-CM

## 2023-10-17 PROCEDURE — 3074F SYST BP LT 130 MM HG: CPT | Performed by: UROLOGY

## 2023-10-17 PROCEDURE — 3078F DIAST BP <80 MM HG: CPT | Performed by: UROLOGY

## 2023-10-17 PROCEDURE — 99244 OFF/OP CNSLTJ NEW/EST MOD 40: CPT | Performed by: UROLOGY

## 2023-10-17 NOTE — PROGRESS NOTES
SUBJECTIVE:  Margo Justice is a 62year old male who presents for a consultation at the request of, and a copy of this note will be sent to, Dr. Ernesto Cabrera, for evaluation of  elevated PSA. He states that the problem is unchanged. Symptoms include elevated PSA noted on routine testing September 14, 2023 of 4.19 compared with a stable PSA previously June 2022 of 1.98. Has a history of Klinefelter syndrome. No known family history of prostate cancer. No bothersome urination symptoms. IPSS 8, quality-of-life index is 0-1. No previous urologic history. No bone pain or weight loss is reported. Luis Eli He denies any other complaints. Allergies:   Flu Virus Vaccine       RASH    History:  Past Medical History:   Diagnosis Date    Asthma     Constipation       Past Surgical History:   Procedure Laterality Date    COLONOSCOPY N/A 7/3/2017    Procedure: COLONOSCOPY;  Surgeon: Bret Ga MD;  Location: [address] [place] [address] ENDOSCOPY      Family History   Problem Relation Age of Onset    Dementia Mother         Alzheimer's Disease      Social History:   Social History     Socioeconomic History    Marital status:    Tobacco Use    Smoking status: Former    Smokeless tobacco: Never   Substance and Sexual Activity    Alcohol use: Yes     Alcohol/week: 0.0 standard drinks of alcohol     Comment: (Beer) occasionally. Drug use: No   Other Topics Concern    Caffeine Concern Yes     Comment: Coffee, 1 cups 3 times weekly    Exercise No            REVIEW OF SYSTEMS:  RESPIRATORY:  Negative for chest tightness, wheezing, cough, shortness of breath,  sputum production,chest pain or pleurisy. CARDIOVASCULAR:  Negative for pain or chest discomfort, dizziness or fainting, palpitations, leg swelling, nocturia, or claudication. GASTROINTESTINAL:  Negative for nausea, vomiting, diarrhea, constipation, heartburn or indigestion, abdominal pains, bloody or tarry stools.   GENERAL: Denies:  weight gain, weight loss, fever, night sweats, bone pain, malaise, and fatigue. Positive for:  none. All other review of systems reviewed and otherwise negative    OBJECTIVE:  /70 (BP Location: Left arm)   Pulse 68   He appears well, in no apparent distress. Alert and oriented times three, pleasant and cooperative. CARDIOVASCULAR:normal apical impulse  RESPIRATORY: no chest wall deformities or tenderness  ABDOMEN: abdomen is soft without significant tenderness, masses, organomegaly or guarding  GENITOURINARY:      Penis: no penile lesions or discharge. Meatus normal location and size. Scrotum: normal in appearance      Right Epididymis and Vas: both are palpably normal.      Right Testis: normal        Left Epididymis and Vas: both are palpably normal.      Left Testis: normal        Inguinal Lymph Nodes: non-palpable both      Prostate: prostate smooth and symmetric without tenderness or nodules, enlarged, 60 grams       Rectal: normal tone, no masses  Skin exam grossly normal  Intact neurologically grossly    ASSESSMENT/PLAN  Elevated psa  (primary encounter diagnosis)    Orders Placed This Encounter      PSA Diagnostic [E]  Reviewed findings at length with patient. Recommended repeat follow-up in 4 weeks with a PSA 1 to 2 days prior to the visit. He understands that if the PSA continues to be elevated additional testing including possible MRI or biopsy may be recommended. He will follow-up accordingly.     Meds This Visit:  Requested Prescriptions      No prescriptions requested or ordered in this encounter       Imaging & Referrals:  None

## 2023-10-18 ENCOUNTER — LAB ENCOUNTER (OUTPATIENT)
Dept: LAB | Age: 58
End: 2023-10-18
Attending: Other
Payer: COMMERCIAL

## 2023-10-18 PROCEDURE — 36415 COLL VENOUS BLD VENIPUNCTURE: CPT | Performed by: OTHER

## 2023-10-18 PROCEDURE — 86618 LYME DISEASE ANTIBODY: CPT | Performed by: OTHER

## 2023-10-20 LAB — B BURGDOR IGG+IGM SER QL: NEGATIVE

## 2023-10-25 ENCOUNTER — TELEPHONE (OUTPATIENT)
Dept: NEUROLOGY | Facility: CLINIC | Age: 58
End: 2023-10-25

## 2023-10-25 NOTE — TELEPHONE ENCOUNTER
----- Message from Michelle Ordonez MD sent at 10/23/2023  8:07 AM CDT -----  Please let the patient know that results of these particular lab tests so far were normal.    Thank you

## 2023-11-09 ENCOUNTER — LAB ENCOUNTER (OUTPATIENT)
Dept: LAB | Facility: HOSPITAL | Age: 58
End: 2023-11-09
Attending: UROLOGY
Payer: COMMERCIAL

## 2023-11-09 DIAGNOSIS — R97.20 ELEVATED PSA: ICD-10-CM

## 2023-11-09 LAB
PSA SERPL-MCNC: 2.28 NG/ML (ref ?–4)
PSA SERPL-MCNC: 3.26 NG/ML (ref ?–4)

## 2023-11-09 PROCEDURE — 84153 ASSAY OF PSA TOTAL: CPT

## 2023-11-09 PROCEDURE — 36415 COLL VENOUS BLD VENIPUNCTURE: CPT

## 2023-11-13 ENCOUNTER — TELEPHONE (OUTPATIENT)
Dept: SURGERY | Facility: CLINIC | Age: 58
End: 2023-11-13

## 2023-11-13 NOTE — TELEPHONE ENCOUNTER
Patient states that he just a blood test done, which is normal, so the patient wants to know if he should still keep tomorrows appointment 11/14/23?

## 2023-11-14 ENCOUNTER — OFFICE VISIT (OUTPATIENT)
Dept: SURGERY | Facility: CLINIC | Age: 58
End: 2023-11-14
Payer: COMMERCIAL

## 2023-11-14 DIAGNOSIS — R97.20 ELEVATED PSA: Primary | ICD-10-CM

## 2023-11-14 PROCEDURE — 99213 OFFICE O/P EST LOW 20 MIN: CPT | Performed by: UROLOGY

## 2023-11-14 NOTE — PROGRESS NOTES
Alicia Jamison is a 62year old male. HPI:     Chief Complaint   Patient presents with    elevated psa     Pt comes today for his 1 month visit, psa completed   11/9/23  8:49 AM   PSA  <=4.00 ng/mL 3.26  Comment: Patient results determined by assays using different manufacturers or methods may not be comparable. Total PSA (IM)  <=4.00 ng/mL 2.28           51-year-old male with elevated serum PSA seen in consultation October 17, 2023 at 4.1 9 September 2023. No known family history of prostate cancer. Digital prostate exam demonstrated BPH. Repeat PSA performed on the old system 3.26 and on the new system better at 2.28. I reviewed these results with the patient today. HISTORY:  Past Medical History:   Diagnosis Date    Asthma     Constipation       Past Surgical History:   Procedure Laterality Date    COLONOSCOPY N/A 7/3/2017    Procedure: COLONOSCOPY;  Surgeon: Rl Gonzalez MD;  Location: [address] [place] [address] ENDOSCOPY      Family History   Problem Relation Age of Onset    Dementia Mother         Alzheimer's Disease      Social History:   Social History     Socioeconomic History    Marital status:    Tobacco Use    Smoking status: Former    Smokeless tobacco: Never   Substance and Sexual Activity    Alcohol use: Yes     Alcohol/week: 0.0 standard drinks of alcohol     Comment: (Beer) occasionally. Drug use: No   Other Topics Concern    Caffeine Concern Yes     Comment: Coffee, 1 cups 3 times weekly    Exercise No        Medications (Active prior to today's visit):  No current outpatient medications on file. Allergies: Allergies   Allergen Reactions    Flu Virus Vaccine RASH         ROS:       PHYSICAL EXAM:        ASSESSMENT/PLAN:   Assessment   Encounter Diagnosis   Name Primary? Elevated PSA Yes       Reviewed findings with patient. Recommended follow-up in 6 months with a repeat PSA 1 to 2 days prior to the visit.          Orders This Visit:  Orders Placed This Encounter   Procedures PSA Diagnostic [E]       Meds This Visit:  Requested Prescriptions      No prescriptions requested or ordered in this encounter       Imaging & Referrals:  None     11/14/2023  Alejandro Chavez MD

## 2024-05-14 ENCOUNTER — LAB ENCOUNTER (OUTPATIENT)
Dept: LAB | Facility: HOSPITAL | Age: 59
End: 2024-05-14
Attending: UROLOGY

## 2024-05-14 DIAGNOSIS — R97.20 ELEVATED PSA: ICD-10-CM

## 2024-05-14 LAB — PSA SERPL-MCNC: 2.19 NG/ML (ref ?–4)

## 2024-05-14 PROCEDURE — 84153 ASSAY OF PSA TOTAL: CPT

## 2024-05-14 PROCEDURE — 36415 COLL VENOUS BLD VENIPUNCTURE: CPT

## 2024-10-17 ENCOUNTER — OFFICE VISIT (OUTPATIENT)
Dept: FAMILY MEDICINE CLINIC | Facility: CLINIC | Age: 59
End: 2024-10-17

## 2024-10-17 VITALS
RESPIRATION RATE: 16 BRPM | DIASTOLIC BLOOD PRESSURE: 73 MMHG | BODY MASS INDEX: 22.84 KG/M2 | HEART RATE: 76 BPM | WEIGHT: 165 LBS | TEMPERATURE: 98 F | HEIGHT: 71.4 IN | SYSTOLIC BLOOD PRESSURE: 117 MMHG

## 2024-10-17 DIAGNOSIS — Z81.8 FAMILY HISTORY OF DEMENTIA: ICD-10-CM

## 2024-10-17 DIAGNOSIS — R25.1 TREMOR: ICD-10-CM

## 2024-10-17 DIAGNOSIS — Z00.00 ROUTINE PHYSICAL EXAMINATION: Primary | ICD-10-CM

## 2024-10-17 NOTE — PROGRESS NOTES
Subjective:   Patient ID: Harmeet Floyd is a 59 year old male.    Patient is here for routine physical exam. No acute issues. No significant chronic medical problems.   Patient is requesting testing.   Diet and exercise have been good.   Past medical history, family history, and social history were reviewed.  Family hx of dementia- mother had this.  Pt has had some concerns about memory. More forgetful recently. No other sig symptoms but hx of tremors that he has seen neurology in past. Has essential tremor.  Pt had hx of elevated PSA and has been seeing urology and had normal PSA on subsequent testing.        History/Other:   Review of Systems   Constitutional: Negative.  Negative for fever.   HENT: Negative.     Eyes: Negative.    Respiratory: Negative.  Negative for cough and shortness of breath.    Cardiovascular: Negative.  Negative for chest pain.   Gastrointestinal: Negative.  Negative for abdominal pain and blood in stool.   Endocrine: Negative.    Genitourinary: Negative.  Negative for difficulty urinating and hematuria.   Musculoskeletal: Negative.    Skin: Negative.    Allergic/Immunologic: Negative.    Neurological: Negative.  Negative for dizziness, syncope and headaches.   Hematological: Negative.    Psychiatric/Behavioral: Negative.  Negative for dysphoric mood. The patient is not nervous/anxious.      No current outpatient medications on file.     Allergies:Allergies[1]    Objective:   Physical Exam  Constitutional:       Appearance: Normal appearance. He is well-developed.   HENT:      Head: Normocephalic.      Right Ear: Tympanic membrane, ear canal and external ear normal.      Left Ear: Tympanic membrane, ear canal and external ear normal.      Nose: Nose normal.      Mouth/Throat:      Mouth: Mucous membranes are moist.      Pharynx: No oropharyngeal exudate or posterior oropharyngeal erythema.   Eyes:      Conjunctiva/sclera: Conjunctivae normal.   Cardiovascular:      Rate and Rhythm:  Normal rate and regular rhythm.      Pulses: Normal pulses.      Heart sounds: Normal heart sounds.   Pulmonary:      Effort: Pulmonary effort is normal. No respiratory distress.      Breath sounds: Normal breath sounds. No wheezing or rales.   Abdominal:      General: Abdomen is flat. There is no distension.      Palpations: Abdomen is soft. There is no mass.      Tenderness: There is no abdominal tenderness.   Musculoskeletal:         General: Normal range of motion.      Cervical back: Normal range of motion and neck supple.   Skin:     General: Skin is warm.   Neurological:      General: No focal deficit present.      Mental Status: He is alert and oriented to person, place, and time.      Sensory: No sensory deficit.      Deep Tendon Reflexes: Reflexes are normal and symmetric. Reflexes normal.   Psychiatric:         Mood and Affect: Mood normal.         Behavior: Behavior normal.         Assessment & Plan:   1. Routine physical examination:  - Exam is unremarkable. Screening tests were discussed, and after discussion, will check lab work as below. Healthy diet, exercise, and weight were discussed. To call if problems and follow up and further management after testing. Routine follow up.     2. Tremor /   3. Family history of dementia:  - After discussion, will send to neurology for further evaluation and treatment; To call if any significant symptoms.           Orders Placed This Encounter   Procedures    Lipid Panel    PSA Total, Screen    CBC, Platelet; No Differential    Comp Metabolic Panel (14)       Meds This Visit:  Requested Prescriptions      No prescriptions requested or ordered in this encounter       Imaging & Referrals:  NEURO - INTERNAL         [1]   Allergies  Allergen Reactions    Flu Virus Vaccine RASH

## 2024-10-22 ENCOUNTER — LAB ENCOUNTER (OUTPATIENT)
Dept: LAB | Facility: HOSPITAL | Age: 59
End: 2024-10-22
Attending: FAMILY MEDICINE
Payer: COMMERCIAL

## 2024-10-22 DIAGNOSIS — Z00.00 ROUTINE PHYSICAL EXAMINATION: ICD-10-CM

## 2024-10-22 LAB
ALBUMIN SERPL-MCNC: 4.3 G/DL (ref 3.2–4.8)
ALBUMIN/GLOB SERPL: 1.6 {RATIO} (ref 1–2)
ALP LIVER SERPL-CCNC: 16 U/L
ALT SERPL-CCNC: 13 U/L
ANION GAP SERPL CALC-SCNC: 2 MMOL/L (ref 0–18)
AST SERPL-CCNC: 17 U/L (ref ?–34)
BILIRUB SERPL-MCNC: 1.1 MG/DL (ref 0.3–1.2)
BUN BLD-MCNC: 16 MG/DL (ref 9–23)
BUN/CREAT SERPL: 17 (ref 10–20)
CALCIUM BLD-MCNC: 9.3 MG/DL (ref 8.7–10.4)
CHLORIDE SERPL-SCNC: 108 MMOL/L (ref 98–112)
CHOLEST SERPL-MCNC: 164 MG/DL (ref ?–200)
CO2 SERPL-SCNC: 29 MMOL/L (ref 21–32)
COMPLEXED PSA SERPL-MCNC: 2.51 NG/ML (ref ?–4)
CREAT BLD-MCNC: 0.94 MG/DL
DEPRECATED RDW RBC AUTO: 43.2 FL (ref 35.1–46.3)
EGFRCR SERPLBLD CKD-EPI 2021: 93 ML/MIN/1.73M2 (ref 60–?)
ERYTHROCYTE [DISTWIDTH] IN BLOOD BY AUTOMATED COUNT: 13 % (ref 11–15)
FASTING PATIENT LIPID ANSWER: YES
FASTING STATUS PATIENT QL REPORTED: YES
GLOBULIN PLAS-MCNC: 2.7 G/DL (ref 2–3.5)
GLUCOSE BLD-MCNC: 84 MG/DL (ref 70–99)
HCT VFR BLD AUTO: 41.6 %
HDLC SERPL-MCNC: 40 MG/DL (ref 40–59)
HGB BLD-MCNC: 14.3 G/DL
LDLC SERPL CALC-MCNC: 110 MG/DL (ref ?–100)
MCH RBC QN AUTO: 31.6 PG (ref 26–34)
MCHC RBC AUTO-ENTMCNC: 34.4 G/DL (ref 31–37)
MCV RBC AUTO: 91.8 FL
NONHDLC SERPL-MCNC: 124 MG/DL (ref ?–130)
OSMOLALITY SERPL CALC.SUM OF ELEC: 288 MOSM/KG (ref 275–295)
PLATELET # BLD AUTO: 152 10(3)UL (ref 150–450)
PLATELETS.RETICULATED NFR BLD AUTO: 5.3 % (ref 0–7)
POTASSIUM SERPL-SCNC: 4 MMOL/L (ref 3.5–5.1)
PROT SERPL-MCNC: 7 G/DL (ref 5.7–8.2)
RBC # BLD AUTO: 4.53 X10(6)UL
SODIUM SERPL-SCNC: 139 MMOL/L (ref 136–145)
TRIGL SERPL-MCNC: 71 MG/DL (ref 30–149)
VLDLC SERPL CALC-MCNC: 12 MG/DL (ref 0–30)
WBC # BLD AUTO: 7.1 X10(3) UL (ref 4–11)

## 2024-10-22 PROCEDURE — 80061 LIPID PANEL: CPT

## 2024-10-22 PROCEDURE — 36415 COLL VENOUS BLD VENIPUNCTURE: CPT

## 2024-10-22 PROCEDURE — 85027 COMPLETE CBC AUTOMATED: CPT

## 2024-10-22 PROCEDURE — 80053 COMPREHEN METABOLIC PANEL: CPT

## (undated) DIAGNOSIS — Z20.822 ENCOUNTER FOR SCREENING LABORATORY TESTING FOR COVID-19 VIRUS IN ASYMPTOMATIC PATIENT: Primary | ICD-10-CM

## (undated) DEVICE — ENDOSCOPY PACK - LOWER: Brand: MEDLINE INDUSTRIES, INC.

## (undated) DEVICE — Device: Brand: DEFENDO AIR/WATER/SUCTION AND BIOPSY VALVE

## (undated) NOTE — MR AVS SNAPSHOT
[facility] SPECIALTY [facility] [place] [address] [address] [address] [phone]               Thank you for choosing us for your health care visit with Nestor Goodpasture, MD.  We are glad to serve you and happy to provide you with this summary of y Order:  [facility] - Internal    Ji Stewart MD   [address] [facility] [address]   Phone:  [phone]   Fax:  [phone]         Referral Orders      Normal Orders This Visit    [facility] - INTERNAL [[id number] CUSTOM]  Order #:  [id number] Hedy, [address] [place] [address]  [facility]     [address] [place] [address]  [address] [facility],[address], [facility]    [address] [place] [address], [address] [place] [address]    Julisa Worthington [phone]- view more details from this visit by going to [email or web address]. If you've recently had a stay at the Hospital you can access your discharge instructions in [facility] by going to Visits < Admission Summaries.  If you've been to the Emergency Depar

## (undated) NOTE — LETTER
April 14, 2021         Spencer Goodpasture, MD  [address] [address]      Patient: Jaida Jeffery   YOB: 1965   Date of Visit: 4/14/2021       Dear Dr. Shirley Crespo MD,    I saw your patient, Jaida Jeffery, on 4/14/2021.  Enclo

## (undated) NOTE — LETTER
06/13/18        [address] [facility]  [address]      Dear Guilherme Enrique records indicate that you have outstanding lab work and or testing that was ordered for you and has not yet been completed:          Lipid Panel [E]

## (undated) NOTE — MR AVS SNAPSHOT
[facility]  [address] [facility] [address]  [phone]               Thank you for choosing us for your health care visit with JAN Greene. We are glad to serve you and happy to provide you with this summary of your visit. Colon cancer screening    -  Primary    Constipation, unspecified constipation type        Blood in stool        Periumbilical pain, chronic          Instructions and Information about Your Health    1. Schedule colonoscopy with Dr. Bessy Brasher w/ [facility] Summaries. If you've been to the Emergency Department or your doctor's office, you can view your past visit information in [facility] by going to Visits < Visit Summaries. [facility] questions? Call [phone] for help.   [facility] is NOT to be used for urge